# Patient Record
Sex: MALE | Race: BLACK OR AFRICAN AMERICAN | NOT HISPANIC OR LATINO | URBAN - METROPOLITAN AREA
[De-identification: names, ages, dates, MRNs, and addresses within clinical notes are randomized per-mention and may not be internally consistent; named-entity substitution may affect disease eponyms.]

---

## 2020-08-16 ENCOUNTER — INPATIENT (INPATIENT)
Facility: HOSPITAL | Age: 40
LOS: 0 days | Discharge: HOME | DRG: 300 | End: 2020-08-17
Attending: STUDENT IN AN ORGANIZED HEALTH CARE EDUCATION/TRAINING PROGRAM | Admitting: STUDENT IN AN ORGANIZED HEALTH CARE EDUCATION/TRAINING PROGRAM
Payer: MEDICAID

## 2020-08-16 VITALS
TEMPERATURE: 98 F | HEART RATE: 90 BPM | DIASTOLIC BLOOD PRESSURE: 99 MMHG | OXYGEN SATURATION: 97 % | HEIGHT: 70 IN | WEIGHT: 315 LBS | RESPIRATION RATE: 17 BRPM | SYSTOLIC BLOOD PRESSURE: 152 MMHG

## 2020-08-16 DIAGNOSIS — I80.00 PHLEBITIS AND THROMBOPHLEBITIS OF SUPERFICIAL VESSELS OF UNSPECIFIED LOWER EXTREMITY: ICD-10-CM

## 2020-08-16 LAB
ALBUMIN SERPL ELPH-MCNC: 2.8 G/DL — LOW (ref 3.4–5)
ALP SERPL-CCNC: 106 U/L — SIGNIFICANT CHANGE UP (ref 40–120)
ALT FLD-CCNC: 49 U/L — HIGH (ref 12–42)
ANION GAP SERPL CALC-SCNC: 8 MMOL/L — LOW (ref 9–16)
APTT BLD: 137.7 SEC — CRITICAL HIGH (ref 27.5–35.5)
APTT BLD: 19.5 SEC — LOW (ref 27.5–35.5)
APTT BLD: 51.9 SEC — HIGH (ref 27.5–35.5)
AST SERPL-CCNC: 30 U/L — SIGNIFICANT CHANGE UP (ref 15–37)
BASOPHILS # BLD AUTO: 0.05 K/UL — SIGNIFICANT CHANGE UP (ref 0–0.2)
BASOPHILS NFR BLD AUTO: 0.5 % — SIGNIFICANT CHANGE UP (ref 0–2)
BILIRUB SERPL-MCNC: 0.3 MG/DL — SIGNIFICANT CHANGE UP (ref 0.2–1.2)
BUN SERPL-MCNC: 12 MG/DL — SIGNIFICANT CHANGE UP (ref 7–23)
CALCIUM SERPL-MCNC: 9.1 MG/DL — SIGNIFICANT CHANGE UP (ref 8.5–10.5)
CHLORIDE SERPL-SCNC: 105 MMOL/L — SIGNIFICANT CHANGE UP (ref 96–108)
CK SERPL-CCNC: 178 U/L — SIGNIFICANT CHANGE UP (ref 39–308)
CO2 SERPL-SCNC: 23 MMOL/L — SIGNIFICANT CHANGE UP (ref 22–31)
CREAT SERPL-MCNC: 0.91 MG/DL — SIGNIFICANT CHANGE UP (ref 0.5–1.3)
CRP SERPL-MCNC: 6 MG/DL — HIGH (ref 0–0.9)
D DIMER BLD IA.RAPID-MCNC: 213 NG/ML DDU — SIGNIFICANT CHANGE UP
EOSINOPHIL # BLD AUTO: 0.43 K/UL — SIGNIFICANT CHANGE UP (ref 0–0.5)
EOSINOPHIL NFR BLD AUTO: 4.5 % — SIGNIFICANT CHANGE UP (ref 0–6)
GLUCOSE SERPL-MCNC: 113 MG/DL — HIGH (ref 70–99)
HCT VFR BLD CALC: 39 % — SIGNIFICANT CHANGE UP (ref 39–50)
HGB BLD-MCNC: 12.5 G/DL — LOW (ref 13–17)
HIV 1 & 2 AB SERPL IA.RAPID: SIGNIFICANT CHANGE UP
IMM GRANULOCYTES NFR BLD AUTO: 1 % — SIGNIFICANT CHANGE UP (ref 0–1.5)
INR BLD: 0.9 — SIGNIFICANT CHANGE UP (ref 0.88–1.16)
LYMPHOCYTES # BLD AUTO: 2.64 K/UL — SIGNIFICANT CHANGE UP (ref 1–3.3)
LYMPHOCYTES # BLD AUTO: 27.4 % — SIGNIFICANT CHANGE UP (ref 13–44)
MCHC RBC-ENTMCNC: 26.9 PG — LOW (ref 27–34)
MCHC RBC-ENTMCNC: 32.1 GM/DL — SIGNIFICANT CHANGE UP (ref 32–36)
MCV RBC AUTO: 83.9 FL — SIGNIFICANT CHANGE UP (ref 80–100)
MONOCYTES # BLD AUTO: 0.84 K/UL — SIGNIFICANT CHANGE UP (ref 0–0.9)
MONOCYTES NFR BLD AUTO: 8.7 % — SIGNIFICANT CHANGE UP (ref 2–14)
NEUTROPHILS # BLD AUTO: 5.56 K/UL — SIGNIFICANT CHANGE UP (ref 1.8–7.4)
NEUTROPHILS NFR BLD AUTO: 57.9 % — SIGNIFICANT CHANGE UP (ref 43–77)
NRBC # BLD: 0 /100 WBCS — SIGNIFICANT CHANGE UP (ref 0–0)
NT-PROBNP SERPL-SCNC: 26 PG/ML — SIGNIFICANT CHANGE UP
PLATELET # BLD AUTO: 354 K/UL — SIGNIFICANT CHANGE UP (ref 150–400)
POTASSIUM SERPL-MCNC: 4.5 MMOL/L — SIGNIFICANT CHANGE UP (ref 3.5–5.3)
POTASSIUM SERPL-SCNC: 4.5 MMOL/L — SIGNIFICANT CHANGE UP (ref 3.5–5.3)
PROT SERPL-MCNC: 7.4 G/DL — SIGNIFICANT CHANGE UP (ref 6.4–8.2)
PROTHROM AB SERPL-ACNC: 10.9 SEC — SIGNIFICANT CHANGE UP (ref 10.6–13.6)
RBC # BLD: 4.65 M/UL — SIGNIFICANT CHANGE UP (ref 4.2–5.8)
RBC # FLD: 13.8 % — SIGNIFICANT CHANGE UP (ref 10.3–14.5)
SARS-COV-2 RNA SPEC QL NAA+PROBE: SIGNIFICANT CHANGE UP
SODIUM SERPL-SCNC: 136 MMOL/L — SIGNIFICANT CHANGE UP (ref 132–145)
TROPONIN I SERPL-MCNC: <0.017 NG/ML — LOW (ref 0.02–0.06)
WBC # BLD: 9.62 K/UL — SIGNIFICANT CHANGE UP (ref 3.8–10.5)
WBC # FLD AUTO: 9.62 K/UL — SIGNIFICANT CHANGE UP (ref 3.8–10.5)

## 2020-08-16 PROCEDURE — 93010 ELECTROCARDIOGRAM REPORT: CPT

## 2020-08-16 PROCEDURE — 71045 X-RAY EXAM CHEST 1 VIEW: CPT | Mod: 26

## 2020-08-16 PROCEDURE — 93971 EXTREMITY STUDY: CPT | Mod: 26,RT

## 2020-08-16 PROCEDURE — 99285 EMERGENCY DEPT VISIT HI MDM: CPT

## 2020-08-16 PROCEDURE — 73706 CT ANGIO LWR EXTR W/O&W/DYE: CPT | Mod: 26,RT

## 2020-08-16 RX ORDER — HEPARIN SODIUM 5000 [USP'U]/ML
2200 INJECTION INTRAVENOUS; SUBCUTANEOUS
Qty: 25000 | Refills: 0 | Status: DISCONTINUED | OUTPATIENT
Start: 2020-08-16 | End: 2020-08-17

## 2020-08-16 RX ORDER — OXYCODONE AND ACETAMINOPHEN 5; 325 MG/1; MG/1
2 TABLET ORAL EVERY 4 HOURS
Refills: 0 | Status: DISCONTINUED | OUTPATIENT
Start: 2020-08-16 | End: 2020-08-17

## 2020-08-16 RX ORDER — ONDANSETRON 8 MG/1
8 TABLET, FILM COATED ORAL ONCE
Refills: 0 | Status: COMPLETED | OUTPATIENT
Start: 2020-08-16 | End: 2020-08-16

## 2020-08-16 RX ORDER — VANCOMYCIN HCL 1 G
2000 VIAL (EA) INTRAVENOUS ONCE
Refills: 0 | Status: COMPLETED | OUTPATIENT
Start: 2020-08-16 | End: 2020-08-16

## 2020-08-16 RX ORDER — HEPARIN SODIUM 5000 [USP'U]/ML
INJECTION INTRAVENOUS; SUBCUTANEOUS
Qty: 25000 | Refills: 0 | Status: DISCONTINUED | OUTPATIENT
Start: 2020-08-16 | End: 2020-08-16

## 2020-08-16 RX ORDER — CEFAZOLIN SODIUM 1 G
1000 VIAL (EA) INJECTION EVERY 8 HOURS
Refills: 0 | Status: DISCONTINUED | OUTPATIENT
Start: 2020-08-16 | End: 2020-08-17

## 2020-08-16 RX ORDER — HEPARIN SODIUM 5000 [USP'U]/ML
10000 INJECTION INTRAVENOUS; SUBCUTANEOUS ONCE
Refills: 0 | Status: COMPLETED | OUTPATIENT
Start: 2020-08-16 | End: 2020-08-16

## 2020-08-16 RX ORDER — MORPHINE SULFATE 50 MG/1
4 CAPSULE, EXTENDED RELEASE ORAL ONCE
Refills: 0 | Status: DISCONTINUED | OUTPATIENT
Start: 2020-08-16 | End: 2020-08-16

## 2020-08-16 RX ORDER — HEPARIN SODIUM 5000 [USP'U]/ML
2000 INJECTION INTRAVENOUS; SUBCUTANEOUS
Qty: 25000 | Refills: 0 | Status: DISCONTINUED | OUTPATIENT
Start: 2020-08-16 | End: 2020-08-16

## 2020-08-16 RX ORDER — ACETAMINOPHEN 500 MG
975 TABLET ORAL ONCE
Refills: 0 | Status: COMPLETED | OUTPATIENT
Start: 2020-08-16 | End: 2020-08-16

## 2020-08-16 RX ADMIN — MORPHINE SULFATE 4 MILLIGRAM(S): 50 CAPSULE, EXTENDED RELEASE ORAL at 07:13

## 2020-08-16 RX ADMIN — MORPHINE SULFATE 4 MILLIGRAM(S): 50 CAPSULE, EXTENDED RELEASE ORAL at 06:25

## 2020-08-16 RX ADMIN — Medication 100 MILLIGRAM(S): at 21:26

## 2020-08-16 RX ADMIN — HEPARIN SODIUM 20 UNIT(S)/HR: 5000 INJECTION INTRAVENOUS; SUBCUTANEOUS at 17:05

## 2020-08-16 RX ADMIN — Medication 975 MILLIGRAM(S): at 08:14

## 2020-08-16 RX ADMIN — HEPARIN SODIUM 22 UNIT(S)/HR: 5000 INJECTION INTRAVENOUS; SUBCUTANEOUS at 23:59

## 2020-08-16 RX ADMIN — HEPARIN SODIUM 10000 UNIT(S): 5000 INJECTION INTRAVENOUS; SUBCUTANEOUS at 10:00

## 2020-08-16 RX ADMIN — Medication 975 MILLIGRAM(S): at 08:44

## 2020-08-16 RX ADMIN — OXYCODONE AND ACETAMINOPHEN 2 TABLET(S): 5; 325 TABLET ORAL at 21:47

## 2020-08-16 RX ADMIN — ONDANSETRON 8 MILLIGRAM(S): 8 TABLET, FILM COATED ORAL at 07:13

## 2020-08-16 RX ADMIN — HEPARIN SODIUM 2400 UNIT(S)/HR: 5000 INJECTION INTRAVENOUS; SUBCUTANEOUS at 09:59

## 2020-08-16 RX ADMIN — OXYCODONE AND ACETAMINOPHEN 2 TABLET(S): 5; 325 TABLET ORAL at 22:47

## 2020-08-16 RX ADMIN — Medication 250 MILLIGRAM(S): at 10:00

## 2020-08-16 NOTE — ED PROVIDER NOTE - PROGRESS NOTE DETAILS
signed out to day team pending CTA/US and appropriate dispo signed out to me pending followup imaging/re-eval. US shows femoral DVT, exam limited due to subcutaneous edema. CTA shows cellulitis of the right lower extremity with reactive lymphadenopathy in the pelvis and inguinal nodes with venous varicosities, otherwise patent arteries. on bedside evaluation, +right lower edema with darkening of skin consistent with underlying PVD, +warmth, semi soft compartments improved with leg elevation. patient unable to bear weight due to pain. distal pulses intact. able to move digits. IV heparin drip/bolus ordered, vancomycin for cellulitis started, d/w Dr. Lo vascular on call, admit to regional bed for further management, low suspicion for covid-19, no s/s covid, covid swab pcr pending, patient agrees with plan signed out to me pending followup imaging/re-eval. US shows femoral DVT, exam limited due to subcutaneous edema. CTA shows cellulitis of the right lower extremity with reactive lymphadenopathy in the pelvis and inguinal nodes with venous varicosities, otherwise patent arteries. on bedside evaluation, +right lower edema with darkening of skin consistent with most likely underlying PVD, +warmth with erythema, semi soft compartments improved with leg elevation. patient unable to bear weight due to pain. distal pulses intact. able to move toes. sensation intact. IV heparin drip/bolus ordered, vancomycin for cellulitis started, d/w Dr. Lo vascular on call, admit to regional bed for further management, low suspicion for covid-19, no s/s covid, covid swab pcr pending, patient agrees with plan

## 2020-08-16 NOTE — ED ADULT TRIAGE NOTE - CHIEF COMPLAINT QUOTE
pt complains of right lower leg cellulitis. States he was at a [facility] few days ago for the same complaint.

## 2020-08-16 NOTE — ED PROVIDER NOTE - CARE PLAN
Principal Discharge DX:	Leg swelling Principal Discharge DX:	DVT (deep venous thrombosis)  Secondary Diagnosis:	Cellulitis

## 2020-08-16 NOTE — ED PROVIDER NOTE - ATTENDING CONTRIBUTION TO CARE
39 yo M with PMHx of DVT 2 yrs ago, off AC 1 yr ago, presenting c/o worsening RLE pain x 2d.  PE VSS in NAD RLE with (+) swelling R mid thigh to R foot, DPI - DP (+)2, (+) calf tenderness (+) mild anterior erythema and skin changes suggestive of cellulitis vs chronic PVD. will do bedside US r/o DVT, CT to r/o myositis, check labs, likely transfer/admission depending on study results.

## 2020-08-16 NOTE — ED ADULT NURSE NOTE - CHIEF COMPLAINT QUOTE
pt complains of right lower leg cellulitis. States he was at a [facility] few days ago for the same complaint.

## 2020-08-16 NOTE — ED PROVIDER NOTE - PHYSICAL EXAMINATION
Gen - WDWN, NAD, comfortable and non-toxic appearing  Skin - warm, dry, intact   HEENT - AT/NC, airway patent, neck supple   CV - S1S2, R/R/R  Resp - CTAB, no r/r/w  GI - soft, ND, NT, no CVAT b/l   MS - w/w/p, no c/c/e  Neuro - AxOx3, ambulatory without gait disturbance Vital Signs - nursing notes reviewed and confirmed  Gen - Obese M, NAD, comfortable and non-toxic appearing, speaking in full sentences   Skin - warm, dry, intact  HEENT - AT/NC, PERRL, EOMI, no conjunctival injection, moist oral mucosa, TM intact b/l with good cone of lights, o/p clear with no erythema, edema, or exudate, uvula midline, airway patent, neck supple and NT, FROM, no JVD or carotid bruits b/l, no palpable nodes  CV - S1S2, R/R/R  Resp - respiration non-labored, CTAB, symmetric bs b/l, no r/r/w  GI - NABS, soft, ND, NT, no rebound or guarding, no CVAT b/l   MS - w/w/p, RLE with moderate edema from ankle to popliteal region with venous stasis skin changes, compartment semi tense with diffuse calf tenderness, no erythema, warmth, streaking, or ecchymosis, +SILT, palpable distal pulses b/l  Neuro - AxOx3, no focal neuro deficits, difficulty weight bearing on the R 2/2 pain

## 2020-08-16 NOTE — H&P ADULT - NSHPPHYSICALEXAM_GEN_ALL_CORE
Constitutional: Man appearing his stated age. Appears uncomfortable and in pain. Not in any acute distress.  Cardiac: Normal sinus rhythm. No chest wall tenderness.  Respiratory: Equal and bilateral chest rise. No respiratory distress. 99% O2 sat on RA.  GI: Abdomen soft and nontender throughout all 4 quadrants.  Vascular:   Left side (unaffected): Biphasic femoral, biphasic popliteal, biphasic PT, palpable DP  Right side (affected): Biphasic femoral, triphasic popliteal, triphasic PT, palpable DP  Extremities: Right lower extremity appears markedly more swollen compared to the left, most pronounced in the below-knee segment. Right lower extremity with moderate degree of hyperpigmentation extending proximally from the ankle to the mid-calf. No wounds or ulcers noted of bilateral lower extremities. No tenderness to palpation of bilateral lower extremities.

## 2020-08-16 NOTE — H&P ADULT - ASSESSMENT
Assessment    Mr. Boucher is a 40 year old man with a known history of unprovoked DVT (last episode 2 years ago, tx with Xarelto for 1y, taken off) presenting with 10 day Hx of RLE pain and swelling with DUS notable for DVT visualized within the right mid and distal femoral vein.    Plan:  -Diagnosis: DVT with cellulitis  -Continue anticoagulation (currently heparinized @ 24mL/hr)  -STAT PTT (last PTT @10am)  -STAT EKG   -Urgent CXR  -Ancef 1000mg IV Q12 x7d for cellulitis  -Compression and elevation of RLE  -Regular diet Assessment    Mr. Boucher is a 40 year old man with a known history of unprovoked DVT (last episode 2 years ago, tx with Xarelto for 1y, taken off) presenting with 10 day Hx of RLE pain and swelling with DUS notable for DVT visualized within the right mid and distal femoral vein.    Plan:  -Diagnosis: DVT with cellulitis  -Continue anticoagulation (currently heparinized @ 24mL/hr)  -STAT PTT (last PTT @10am)  -STAT EKG   -Urgent CXR  -Ancef 1000mg IV Q12 x7d for cellulitis  -Compression and elevation of RLE  -Regular diet      Senior Resident Note:  Agree with above. Hemodynamically stable, saturating well on room air. Physical exam notable for 2+ edema, non-tender to palpation, no wounds, palpable DP+, hyperpigmentation of skin and warm to touch. Given clinical and CT findings of cellulitis and DVT, plan for Ancef with heparin gtt.

## 2020-08-16 NOTE — PATIENT PROFILE ADULT - NSPROMUTANXFEARADDRESSFT_GEN_A_NUR
Subjective


Date of Service: 04/26/17


Interval History: 





Patient still with headache she gets daily for last year. No blurry vision.





Objective


Active Medications: 








Acetaminophen (Tylenol Tab*)  650 mg PO Q4H PRN


   PRN Reason: FEVER/PAIN


   Last Admin: 04/26/17 15:42 Dose:  650 mg


Acetaminophen/Butalbital/Caffeine (Fioricet Tab*)  1 tab PO Q4H PRN


   PRN Reason: HEADACHE


   Last Admin: 04/26/17 12:21 Dose:  1 tab


Hydrocodone Bitart/Acetaminophen (Norco 5-325 Tab*)  1 tab PO Q4H PRN


   PRN Reason: PAIN


Aspirin (Aspirin Ec Low Dose*)  81 mg PO DAILY [facility]


   Last Admin: 04/26/17 07:54 Dose:  81 mg


Atorvastatin Calcium (Lipitor*)  20 mg PO BEDTIME [facility]


   Last Admin: 04/25/17 20:31 Dose:  20 mg


Dextrose (D50w Syringe 50 Ml*)  12.5 gm IV PUSH .FOR FS < 60 - SS PRN


   PRN Reason: FS < 60


Enoxaparin Sodium (Lovenox(*))  70 mg SUBCUT Q12H [facility]


Sodium Chloride (Ns 0.9% 1000 Ml*)  1,000 mls @ 150 mls/hr IV PER RATE [facility]


   Last Admin: 04/26/17 11:59 Dose:  150 mls/hr


Insulin Human Lispro (Humalog*)  0 units SUBCUT AC [facility]


   PRN Reason: Protocol


   Last Admin: 04/26/17 16:56 Dose:  Not Given


Magnesium Oxide (Magox 400 Tab*)  400 mg PO DAILY [facility]


   Last Admin: 04/26/17 07:54 Dose:  400 mg


Metoclopramide HCl (Reglan Tab*)  5 mg PO TID PRN


   PRN Reason: NAUSEA


Metoprolol Succinate (Toprol Xl Tab*)  25 mg PO DAILY [facility]


   Last Admin: 04/26/17 07:54 Dose:  25 mg


Ondansetron HCl (Zofran Inj*)  4 mg IV Q6H PRN


   PRN Reason: NAUSEA


Tramadol HCl (Ultram*)  50 mg PO Q6H PRN


   PRN Reason: PAIN


   Last Admin: 04/26/17 07:54 Dose:  50 mg


Warfarin Sodium (Coumadin Tab(*))  5 mg PO DAILY@1700 [facility]


   PRN Reason: Protocol








 Vital Signs











  04/25/17 04/25/17 04/25/17





  19:00 19:01 19:13


 


Temperature   


 


Pulse Rate 82 79 


 


Respiratory 17 17 





Rate   


 


Blood Pressure   143/72





(mmHg)   


 


O2 Sat by Pulse 95 96 





Oximetry   














  04/25/17 04/25/17 04/25/17





  19:30 19:47 20:00


 


Temperature  97.7 F 


 


Pulse Rate 78 87 91


 


Respiratory 16 16 16





Rate   


 


Blood Pressure 130/63 140/75 129/66





(mmHg)   


 


O2 Sat by Pulse 97 98 96





Oximetry   














  04/25/17 04/25/17 04/26/17





  20:03 23:16 00:34


 


Temperature  98.0 F 


 


Pulse Rate 91 93 


 


Respiratory 19 16 





Rate   


 


Blood Pressure  129/64 





(mmHg)   


 


O2 Sat by Pulse 97 94 94





Oximetry   














  04/26/17 04/26/17 04/26/17





  03:23 07:29 07:43


 


Temperature 98.4 F 97.9 F 


 


Pulse Rate 75 100 


 


Respiratory 16 16 16





Rate   


 


Blood Pressure 113/49 151/67 





(mmHg)   


 


O2 Sat by Pulse 95 96 





Oximetry   














  04/26/17 04/26/17 04/26/17





  07:54 09:54 11:21


 


Temperature   97.8 F


 


Pulse Rate   81


 


Respiratory 16 16 16





Rate   


 


Blood Pressure   111/61





(mmHg)   


 


O2 Sat by Pulse   94





Oximetry   














  04/26/17 04/26/17 04/26/17





  12:21 14:21 14:43


 


Temperature   98.1 F


 


Pulse Rate   73


 


Respiratory 18 16 18





Rate   


 


Blood Pressure   136/59





(mmHg)   


 


O2 Sat by Pulse   100





Oximetry   











Oxygen Devices in Use Now: None


Appearance: Elderly woman lying in bed in NAD


Eyes: No Scleral Icterus


Ears/Nose/Mouth/Throat: Mucous Membranes Moist


Neck: No Thyroid Enlargement, Masses


Respiratory: Clear to Auscultation


Cardiovascular: - - S1S2 kat


Abdominal: NL Sounds; No Tenderness; No Distention, No Hepatosplenomegaly


Lymphatic: No Cervical Adenopathy


Extremities: No Clubbing, Cyanosis


Skin: No Rash or Ulcers


Neurological: Alert and Oriented x 3


Result Diagrams: 


 04/26/17 05:27





 04/26/17 05:27





Assess/Plan/Problems-Billing


Assessment: 





80 year old with headache and blurry vision found to have new PE on CTA of 

chest.





- Patient Problems


(1) Pulmonary embolism


Current Visit: Yes   Status: Acute   Code(s): I26.99 - OTHER PULMONARY EMBOLISM 

WITHOUT ACUTE COR PULMONALE   SNOMED Code(s): 01341336


   Comment: Appreciate hematology's input. They think likely failure of 

Eliquis. Start Lovenox and coumadin.   





(2) CAD (coronary artery disease)


Current Visit: Yes   Status: Acute   Code(s): I25.10 - ATHSCL HEART DISEASE OF 

NATIVE CORONARY ARTERY W/O ANG PCTRS   SNOMED Code(s): 22286888


   Comment: Stable. Continue current regimen.   





(3) Diabetes


Current Visit: Yes   Status: Acute   Code(s): E11.9 - TYPE 2 DIABETES MELLITUS 

WITHOUT COMPLICATIONS   SNOMED Code(s): 18660384


   Comment: FS from 88 - 202 . Continue FS with SSI.   





(4) Hypertension


Current Visit: Yes   Status: Acute   Code(s): I10 - ESSENTIAL (PRIMARY) 

HYPERTENSION   SNOMED Code(s): 34506618


   Comment: BP adequately controlled. Continue current rx.   





(5) Hyperlipidemia


Current Visit: Yes   Status: Acute   Code(s): E78.5 - HYPERLIPIDEMIA, 

UNSPECIFIED   SNOMED Code(s): 13129350


   Comment: Stable. Continue statin.   





(6) Headache


Current Visit: Yes   Status: Acute   Code(s): R51 - HEADACHE   SNOMED Code(s): 

89250898


   Comment: Questionable etiology. Trial of Fioricet. ?? PT. Follow up with 

neurology as outpt.   





(7) DVT prophylaxis


Current Visit: Yes   Status: Acute   Code(s): NGT9070 -    SNOMED Code(s): 

719046323


   Comment: Lovenox   





(8) Full code status


Current Visit: Yes   Status: Acute   Code(s): Z78.9 - OTHER SPECIFIED HEALTH 

STATUS   SNOMED Code(s): 246748505 n/a

## 2020-08-16 NOTE — H&P ADULT - HISTORY OF PRESENT ILLNESS
Mr. Taylor is a 40 year old man who presented to [facility] this morning with a chief complaint of worsening RLE pain. He was transferred to [facility] for further management. Per Mr. Taylor, he has a PMH of DVT for which he was treated 2 years ago. At that time, he was treated with an approximately 1 year course of Xarelto, after which he was taken off because he did not have any further episodes of DVT. He does not recall any provocating factors for his previous DVT. He notes he has never been diagnosed with any kind of hypercoagulable disorder but notes that his mother had passed away because of a massive pulmonary embolism. Today, Mr. Taylor reports that he has had a gradual onset of pain and swelling of his RLE for the past 10 days. He initially went to a hospital in [place], where they did an ultrasound that was negative. He was diagnosed with cellulitis and discharged home with a regimen of Bactrim. He notes that he has been taking the antibiotics without any resultant improvement in his symptoms. He ultimately came to the hospital today because he experienced unbearable pain that made it difficult for him to walk. He expresses associated shortness of breath but no difficulty breathing. He denies any fevers, chills, chest pain, palpations, cough, or wheezing.

## 2020-08-16 NOTE — PATIENT PROFILE ADULT - ARE SIGNIFICANT INDICATORS COMPLETE.
Detail Level: Simple Price (Do Not Change): 0.00 Instructions: This plan will send the code FBSE to the PM system.  DO NOT or CHANGE the price. Yes

## 2020-08-16 NOTE — ED ADULT NURSE NOTE - CHPI ED NUR SYMPTOMS NEG
no dizziness/no nausea/no weakness/no chills/no pain/no vomiting/no fever/no tingling/no decreased eating/drinking

## 2020-08-16 NOTE — ED PROVIDER NOTE - OBJECTIVE STATEMENT
41 yo M with PMHx of DVT 2 yrs ago, off AC 1 yr ago, presenting c/o worsening RLE pain x 2d.  Pt reports gradual onset of pain and swelling to the RLE since 10d, seen at hospital in [facility] with no DVT seen at that time and dx with cellulitis, d/c'd home on course of ibuprofen and bactrim.  Pt has been taking them without improvement in sx. Noted worsening pain today with difficulty walking, MARRERO, and mild chest tightness with ambulation.  Denies fever, chills, trauma, fall, warmth, insect bite, palpitations, diaphoresis, SOB, orthopnea, cough, hemoptysis, wheezing, focal weakness, numbness, tingling, paresthesia, HA, dizziness, neck pain, N/V/D/C, abdominal pain, change in urinary/bowel function, and malaise.

## 2020-08-16 NOTE — H&P ADULT - NSHPLABSRESULTS_GEN_ALL_CORE
FINDINGS:    Thigh veins: The right commonfemoral, proximal femoral, proximal greater saphenous, and proximal deep femoral veins are patent and free of thrombus. The veins are normally compressible and have normal phasic flow and augmentation response. There is thrombus within the right mid and distal femoral vein. The right popliteal vein was not visualized due to extensive subcutaneous edema.    Calf veins: The paired peroneal and posterior tibial calf veins were not visualized due to extensive subcutaneous edema.    Contralateral CFV:The contralateral (left) common femoral vein is patent and free of thrombus.      IMPRESSION:  Deep vein thrombosis visualized within the right mid and distal femoral vein. The right popliteal and calf veins were not visualized due to extensive subcutaneous edema.    The above results were discussed with Dr. Mason  on 8/16/2020 8:51 AM.

## 2020-08-16 NOTE — ED ADULT NURSE NOTE - OBJECTIVE STATEMENT
c/o RLE pain swelling, greater in circumference than left  pt endorsed increase swelling x 10 days  skin taut, firm to touch beginning  right mid calf to foot  pedal pulses present

## 2020-08-16 NOTE — H&P ADULT - NSHPSOCIALHISTORY_GEN_ALL_CORE
Alcohol: Occasional alcohol use   Smoking: Occasional cigarette and marijuana use  Substance Use: Never user of any IV drugs

## 2020-08-16 NOTE — ED PROVIDER NOTE - CLINICAL SUMMARY MEDICAL DECISION MAKING FREE TEXT BOX
pt with h/o DVT, off AC x 1 yr now, p/w progressive worsening RLE pain and swelling, afebrile, on bactrim x few days for possible cellulitis, will obtain labs, CTA to r/o arterial insufficiency w venous run off, possible US, pain control, and reassess

## 2020-08-17 VITALS
DIASTOLIC BLOOD PRESSURE: 95 MMHG | SYSTOLIC BLOOD PRESSURE: 136 MMHG | OXYGEN SATURATION: 100 % | HEART RATE: 60 BPM | TEMPERATURE: 98 F | RESPIRATION RATE: 16 BRPM

## 2020-08-17 LAB
ANION GAP SERPL CALC-SCNC: 8 MMOL/L — SIGNIFICANT CHANGE UP (ref 5–17)
APTT BLD: 71.6 SEC — HIGH (ref 27.5–35.5)
BUN SERPL-MCNC: 7 MG/DL — SIGNIFICANT CHANGE UP (ref 7–23)
CALCIUM SERPL-MCNC: 9.3 MG/DL — SIGNIFICANT CHANGE UP (ref 8.4–10.5)
CHLORIDE SERPL-SCNC: 102 MMOL/L — SIGNIFICANT CHANGE UP (ref 96–108)
CO2 SERPL-SCNC: 26 MMOL/L — SIGNIFICANT CHANGE UP (ref 22–31)
CREAT SERPL-MCNC: 0.85 MG/DL — SIGNIFICANT CHANGE UP (ref 0.5–1.3)
GLUCOSE SERPL-MCNC: 96 MG/DL — SIGNIFICANT CHANGE UP (ref 70–99)
HCT VFR BLD CALC: 40.2 % — SIGNIFICANT CHANGE UP (ref 39–50)
HGB BLD-MCNC: 12.6 G/DL — LOW (ref 13–17)
MAGNESIUM SERPL-MCNC: 2.3 MG/DL — SIGNIFICANT CHANGE UP (ref 1.6–2.6)
MCHC RBC-ENTMCNC: 26.8 PG — LOW (ref 27–34)
MCHC RBC-ENTMCNC: 31.3 GM/DL — LOW (ref 32–36)
MCV RBC AUTO: 85.4 FL — SIGNIFICANT CHANGE UP (ref 80–100)
NRBC # BLD: 0 /100 WBCS — SIGNIFICANT CHANGE UP (ref 0–0)
PHOSPHATE SERPL-MCNC: 2.7 MG/DL — SIGNIFICANT CHANGE UP (ref 2.5–4.5)
PLATELET # BLD AUTO: 384 K/UL — SIGNIFICANT CHANGE UP (ref 150–400)
POTASSIUM SERPL-MCNC: 4.5 MMOL/L — SIGNIFICANT CHANGE UP (ref 3.5–5.3)
POTASSIUM SERPL-SCNC: 4.5 MMOL/L — SIGNIFICANT CHANGE UP (ref 3.5–5.3)
RBC # BLD: 4.71 M/UL — SIGNIFICANT CHANGE UP (ref 4.2–5.8)
RBC # FLD: 13.7 % — SIGNIFICANT CHANGE UP (ref 10.3–14.5)
SODIUM SERPL-SCNC: 136 MMOL/L — SIGNIFICANT CHANGE UP (ref 135–145)
WBC # BLD: 9.69 K/UL — SIGNIFICANT CHANGE UP (ref 3.8–10.5)
WBC # FLD AUTO: 9.69 K/UL — SIGNIFICANT CHANGE UP (ref 3.8–10.5)

## 2020-08-17 PROCEDURE — 85379 FIBRIN DEGRADATION QUANT: CPT

## 2020-08-17 PROCEDURE — 83880 ASSAY OF NATRIURETIC PEPTIDE: CPT

## 2020-08-17 PROCEDURE — 96374 THER/PROPH/DIAG INJ IV PUSH: CPT | Mod: XU

## 2020-08-17 PROCEDURE — 85027 COMPLETE CBC AUTOMATED: CPT

## 2020-08-17 PROCEDURE — 83735 ASSAY OF MAGNESIUM: CPT

## 2020-08-17 PROCEDURE — 73706 CT ANGIO LWR EXTR W/O&W/DYE: CPT

## 2020-08-17 PROCEDURE — 86703 HIV-1/HIV-2 1 RESULT ANTBDY: CPT

## 2020-08-17 PROCEDURE — 85730 THROMBOPLASTIN TIME PARTIAL: CPT

## 2020-08-17 PROCEDURE — 84100 ASSAY OF PHOSPHORUS: CPT

## 2020-08-17 PROCEDURE — 85025 COMPLETE CBC W/AUTO DIFF WBC: CPT

## 2020-08-17 PROCEDURE — 84484 ASSAY OF TROPONIN QUANT: CPT

## 2020-08-17 PROCEDURE — 99285 EMERGENCY DEPT VISIT HI MDM: CPT | Mod: 25

## 2020-08-17 PROCEDURE — 93005 ELECTROCARDIOGRAM TRACING: CPT

## 2020-08-17 PROCEDURE — 99223 1ST HOSP IP/OBS HIGH 75: CPT

## 2020-08-17 PROCEDURE — 82550 ASSAY OF CK (CPK): CPT

## 2020-08-17 PROCEDURE — 80053 COMPREHEN METABOLIC PANEL: CPT

## 2020-08-17 PROCEDURE — 87635 SARS-COV-2 COVID-19 AMP PRB: CPT

## 2020-08-17 PROCEDURE — 36415 COLL VENOUS BLD VENIPUNCTURE: CPT

## 2020-08-17 PROCEDURE — 86140 C-REACTIVE PROTEIN: CPT

## 2020-08-17 PROCEDURE — 99238 HOSP IP/OBS DSCHRG MGMT 30/<: CPT

## 2020-08-17 PROCEDURE — 85610 PROTHROMBIN TIME: CPT

## 2020-08-17 PROCEDURE — 93971 EXTREMITY STUDY: CPT

## 2020-08-17 PROCEDURE — 71045 X-RAY EXAM CHEST 1 VIEW: CPT

## 2020-08-17 PROCEDURE — 80048 BASIC METABOLIC PNL TOTAL CA: CPT

## 2020-08-17 PROCEDURE — 96375 TX/PRO/DX INJ NEW DRUG ADDON: CPT | Mod: XU

## 2020-08-17 RX ORDER — CEPHALEXIN 500 MG
1 CAPSULE ORAL
Qty: 7 | Refills: 0
Start: 2020-08-17 | End: 2020-08-23

## 2020-08-17 RX ORDER — CEPHALEXIN 500 MG
1 CAPSULE ORAL
Qty: 28 | Refills: 0
Start: 2020-08-17 | End: 2020-08-23

## 2020-08-17 RX ORDER — RIVAROXABAN 15 MG-20MG
1 KIT ORAL
Qty: 1 | Refills: 0
Start: 2020-08-17

## 2020-08-17 RX ADMIN — OXYCODONE AND ACETAMINOPHEN 2 TABLET(S): 5; 325 TABLET ORAL at 05:59

## 2020-08-17 RX ADMIN — Medication 100 MILLIGRAM(S): at 04:59

## 2020-08-17 RX ADMIN — OXYCODONE AND ACETAMINOPHEN 2 TABLET(S): 5; 325 TABLET ORAL at 10:00

## 2020-08-17 RX ADMIN — OXYCODONE AND ACETAMINOPHEN 2 TABLET(S): 5; 325 TABLET ORAL at 04:59

## 2020-08-17 RX ADMIN — OXYCODONE AND ACETAMINOPHEN 2 TABLET(S): 5; 325 TABLET ORAL at 09:08

## 2020-08-17 NOTE — PROGRESS NOTE ADULT - SUBJECTIVE AND OBJECTIVE BOX
24hr Events:  O/N:10pm PTT 51.9, heparin gtt increased from 20ml/hr to 22ml/hr, VSS  8/16: Admitted for cellulitis + DVT. Heparin initially at 24, supratherapeutic, held for 1h and restarted at 20. Ancef [id number] for cellulitis. Regular diet. HDS, satting well on RA. Compression and elevation of leg.        Assessment/Plan:  Mr. Boucher is a 40 year old man with a known history of unprovoked DVT (last episode 2 years ago, tx with Xarelto for 1y, taken off) presenting with 10 day Hx of RLE pain and swelling with DUS notable for DVT visualized within the right mid and distal femoral vein.    Plan:  -Diagnosis: DVT with cellulitis  -Continue anticoagulation (currently heparinized @ 22mL/hr)  CXR  -Ancef 1000mg IV Q12 x7d for cellulitis  -Compression and elevation of RLE  -Regular diet 24hr Events:  O/N:10pm PTT 51.9, heparin gtt increased from 20ml/hr to 22ml/hr, VSS  8/16: Admitted for cellulitis + DVT. Heparin initially at 24, supratherapeutic, held for 1h and restarted at 20. Ancef [id number] for cellulitis. Regular diet. HDS, satting well on RA. Compression and elevation of leg.    This AM: Patient resting in bed endorsing RLE pain. No chest pain or SOB, vital signs stable.      MEDICATIONS  (STANDING):  ceFAZolin   IVPB 1000 milliGRAM(s) IV Intermittent every 8 hours  heparin  Infusion 2200 Unit(s)/Hr (22 mL/Hr) IV Continuous <Continuous>    MEDICATIONS  (PRN):  oxycodone    5 mG/acetaminophen 325 mG 2 Tablet(s) Oral every 4 hours PRN Severe Pain (7 - 10)      Allergies    No Known Allergies    Intolerances          Vital Signs Last 24 Hrs  T(C): 36.9 (17 Aug 2020 05:23), Max: 37.2 (16 Aug 2020 16:17)  T(F): 98.4 (17 Aug 2020 05:23), Max: 98.9 (16 Aug 2020 16:17)  HR: 69 (17 Aug 2020 05:23) (63 - 71)  BP: 137/88 (17 Aug 2020 05:23) (132/87 - 137/92)  BP(mean): --  RR: 17 (17 Aug 2020 05:23) (17 - 18)  SpO2: 98% (17 Aug 2020 05:23) (98% - 100%)  CAPILLARY BLOOD GLUCOSE          08-16 @ 07:01  -  08-17 @ 07:00  --------------------------------------------------------  IN: 1624 mL / OUT: 1640 mL / NET: -16 mL        Physical Exam:    Daily     Daily   General:  Well appearing, NAD  CV:  RRR  Lungs:  nonlabored breathing  Abdomen:  Soft, non-tender, no distended  Extremities: RLE edema, mild tenderness to palpation. Palpable pulses b/l.   Neuro:  AAOx3    LABS:                        12.5   9.62  )-----------( 354      ( 16 Aug 2020 06:54 )             39.0     08-16    136  |  105  |  12  ----------------------------<  113<H>  4.5   |  23  |  0.91    Ca    9.1      16 Aug 2020 06:29    TPro  7.4  /  Alb  2.8<L>  /  TBili  0.3  /  DBili  x   /  AST  30  /  ALT  49<H>  /  AlkPhos  106  08-16    PT/INR - ( 16 Aug 2020 06:29 )   PT: 10.9 sec;   INR: 0.90          PTT - ( 16 Aug 2020 22:44 )  PTT:51.9 sec        ---------------------------------------------------------------------------  PLEASE CHECK WHEN PRESENT:     [  ] Heart Failure     [  ] Acute     [  ] Acute on Chronic     [  ] Chronic  -------------------------------------------------------------------     [  ]Diastolic [HFpEF]     [  ]Systolic [HFrEF]     [  ]Combined [HFpEF & HFrEF]     [  ] afib     [  ] hypertensive heart disease     [  ]Other:  -------------------------------------------------------------------  [ ] Respiratory failure  [ ] Acute cor pulmonale  [ ] Asthma/COPD Exacerbation  [ ] Pleural effusion  [ ] Aspiration pneumonia  -------------------------------------------------------------------  [  ]VAISHALI     [  ]ATN     [  ]Reneal Medullary Necrosis     [  ]Renal Cortical Necrosis     [  ]Other Pathological Lesions:    [  ]CKD 1  [  ]CKD 2  [  ]CKD 3  [  ]CKD 4  [  ]CKD 5  [  ]Other  -------------------------------------------------------------------  [  ]Diabetes  [  ] Diabetic PVD Ulcer  [  ] Neuropathic ulcer to DM  [  ] Diabetes with Nephropathy  [  ] Osteomyelitis due to diabetes  --------------------------------------------------------------------  [  ]Malnutrition: See Nutrition Note  [  ]Cachexia  [  ]Other:   [  ]Supplement Ordered:  [x]Morbid Obesity (BMI >=40]  ---------------------------------------------------------------------  [ ] Sepsis/severe sepsis/septic shock  [ ] UTI  [ ] Pneumonia  -----------------------------------------------------------------------  [ ] Acidosis/alkalosis  [ ] Fluid overload  [ ] Hypokalemia  [ ] Hyperkalemia  [ ] Hypomagnesemia  [ ] Hypophosphatemia  [ ] Hyperphosphatemia  ------------------------------------------------------------------------  [ ] Acute blood loss anemia  [ ] Post op blood loss anemia  [ ] Iron deficiency anemia  [ ] Anemia due to chronic disease  [ ] Hypercoagulable state  ----------------------------------------------------------------------  [ ] Cerebral infarction  [ ] Transient ischemia attack  [ ] Encephalopathy      Assessment/Plan:  Mr. Taylor is a 40 year old man with a known history of unprovoked DVT (last episode 2 years ago, tx with Xarelto for 1y, taken off) presenting with 10 day Hx of RLE pain and swelling with DUS notable for DVT visualized within the right mid and distal femoral vein. Diagnosed with DVT with cellulitis.    Plan:  -Continue anticoagulation (currently heparinized @ 22mL/hr)  CXR  -Ancef 1000mg IV Q12 x7d for cellulitis  -Compression and elevation of RLE  -Regular diet  -Pain control - 5 mg/325 mg oxycodone/acetaminophen

## 2020-08-17 NOTE — DISCHARGE NOTE PROVIDER - NSDCMRMEDTOKEN_GEN_ALL_CORE_FT
Keflex 500 mg oral capsule: 1 cap(s) orally 4 times a day   Xarelto Starter Pack 15 mg-20 mg oral kit: 1 packet(s) orally once   15 mg twice a day for 3 weeks  20 mg once a day after that indefinitely Keflex 500 mg oral capsule: 1 cap(s) orally 4 times a day   oxycodone-acetaminophen 5 mg-300 mg oral tablet: 1 tab(s) orally every 12 hours MDD:2 tabs  Xarelto Starter Pack 15 mg-20 mg oral kit: 1 packet(s) orally once   15 mg twice a day for 3 weeks  20 mg once a day after that indefinitely

## 2020-08-17 NOTE — DISCHARGE NOTE PROVIDER - HOSPITAL COURSE
39 yo M who presented to [facility] this morning w/ a CC of worsening RLE pain. He was transferred to [facility] for further management. He has a PMH of DVT for which he was treated 2 years ago. At that time, he was treated with an approximately 1 year course of Xarelto, after which he was taken off because he did not have any further episodes of DVT. He does not recall any provocating factors for his previous DVT. He notes he has never been diagnosed w/ any kind of hypercoagulable disorder but notes that his mother had passed away because of a massive pulmonary embolism. Today, Mr. Taylor reports that he has had a gradual onset of pain and swelling of his RLE for the past 10 days. He initially went to a hospital in [place], where they did an ultrasound that was negative. He was diagnosed with cellulitis and discharged home w/ a regimen of Bactrim. He notes that he has been taking the antibiotics without any resultant improvement in his symptoms. He ultimately came to the hospital today because he experienced unbearable pain that made it difficult for him to walk. He expresses associated shortness of breath but no difficulty breathing. He denies any fevers, chills, chest pain, palpations, cough, or wheezing. The patient was admitted to Vascular Surgery service for further care.            8/16: Admitted for cellulitis + DVT. Heparin initially at 24 mL/hr (supratherapeutic), held for 1h and restarted at 20 mL/hr. Ancef 100mg q12 for cellulitis. HDS, saturating well on RA. Compression and elevation of leg. CT of the right lower extremity demonstrated cellulitis of the right lower extremity with reactive lymphadenopathy in the pelvis and inguinal nodes and venous varicosities. CT angio of the left lower extremity. US Duplex showed deep vein thrombosis visualized within the right mid and distal femoral vein. The right popliteal and calf veins were not visualized due to extensive subcutaneous edema.        O/N:10pm PTT 51.9, Heparin gtt. increased from 20 mL/hr to 22mL/hr.        The patient is ready to be discharged.

## 2020-08-17 NOTE — CONSULT NOTE ADULT - SUBJECTIVE AND OBJECTIVE BOX
CC: RLE pain/swelling    HPI:  Per admission H&P:  "Mr. Taylor is a 40 year old man who presented to [facility] this morning with a chief complaint of worsening RLE pain. He was transferred to [facility] for further management. Per Mr. Taylor, he has a PMH of DVT for which he was treated 2 years ago. At that time, he was treated with an approximately 1 year course of Xarelto, after which he was taken off because he did not have any further episodes of DVT. He does not recall any provocating factors for his previous DVT. He notes he has never been diagnosed with any kind of hypercoagulable disorder but notes that his mother had passed away because of a massive pulmonary embolism. Today, Mr. Taylor reports that he has had a gradual onset of pain and swelling of his RLE for the past 10 days. He initially went to a hospital in [place], where they did an ultrasound that was negative. He was diagnosed with cellulitis and discharged home with a regimen of Bactrim. He notes that he has been taking the antibiotics without any resultant improvement in his symptoms. He ultimately came to the hospital today because he experienced unbearable pain that made it difficult for him to walk. He expresses associated shortness of breath but no difficulty breathing. He denies any fevers, chills, chest pain, palpations, cough, or wheezing. (16 Aug 2020 15:20)"    Patient yesterday afternoon to vascular surgery service, started on heparin gtt. Seen by me this morning; he confirms above history. Pain/swelling stable. No fever/chills, SOB, chest pain, headache, dizziness, lightheadedness. Denies any known history of cancers, no recent travel, works as a  where he is on his feet most of the time.     12 point ROS reviewed and negative except as otherwise stated in HPI and below    PAST MEDICAL & SURGICAL HISTORY:  DVT (deep venous thrombosis)  No significant past surgical history    SOCIAL HISTORY:  Tobacco: denies  Alcohol: social  Illicit Drugs: daily smoked marijuana  Living situation: alone  ADLs: independent  works as     FAMILY HISTORY:  Mother had PE    ALLERGIES:  No Known Allergies      HOME MEDICATIONS:  none    Vital Signs Last 24 Hrs  T(F): 98.1 (17 Aug 2020 08:30), Max: 98.9 (16 Aug 2020 16:17)  HR: 60 (17 Aug 2020 08:30) (60 - 71)  BP: 136/95 (17 Aug 2020 08:30) (132/87 - 137/92)  RR: 16 (17 Aug 2020 08:30) (16 - 18)  SpO2: 100% (17 Aug 2020 08:30) (98% - 100%)    PHYSICAL EXAM:  GENERAL: pleasant, appropriate, no acute distress, well-groomed, well-developed  HEAD:  Atraumatic, Normocephalic  EYES: EOMI, PERRLA, conjunctiva and sclera clear  ENMT: No tonsillar erythema, exudates, or enlargement; Moist mucous membranes, Good dentition  NECK: Supple, No JVD  CHEST/LUNG: Clear to auscultation bilaterally; No rales, rhonchi, wheezing, or rubs  HEART: Regular rate and rhythm; S1/S2, No murmurs, rubs, or gallops  ABDOMEN: Obese, Soft, Nontender, Nondistended; Bowel sounds present  VASCULAR: Normal pulses, Normal capillary refill  EXTREMITIES:  2+ Peripheral Pulses, No cyanosis, RLE swollen and warm, tender to palpation, wrapped in kerlix  LYMPH: No lymphadenopathy noted  SKIN: Warm, Intact  PSYCH: Normal mood and affect  NERVOUS SYSTEM:  A/O x3, CN 2-12 intact, No focal deficits    LABS:                        12.6   9.69  )-----------( 384      ( 17 Aug 2020 07:37 )             40.2     08-17    136  |  102  |  7   ----------------------------<  96  4.5   |  26  |  0.85    Ca    9.3      17 Aug 2020 07:37  Phos  2.7     08-17  Mg     2.3     08-17    TPro  7.4  /  Alb  2.8  /  TBili  0.3  /  DBili  x   /  AST  30  /  ALT  49  /  AlkPhos  106  08-16    PT/INR - ( 16 Aug 2020 06:29 )   PT: 10.9 sec;   INR: 0.90          PTT - ( 17 Aug 2020 07:37 )  PTT:71.6 sec        RADIOLOGY & ADDITIONAL TESTS:< from: US Duplex Ext Veins Limited, Right (08.16.20 @ 08:47) >  FINDINGS:    Thigh veins: The right commonfemoral, proximal femoral, proximal greater saphenous, and proximal deep femoral veins are patent and free of thrombus. The veins are normally compressible and have normal phasic flow and augmentation response. There is thrombus within the right mid and distal femoral vein. The right popliteal vein was not visualized due to extensive subcutaneous edema.    Calf veins: The paired peroneal and posterior tibial calf veins were not visualized due to extensive subcutaneous edema.    Contralateral CFV:The contralateral (left) common femoral vein is patent and free of thrombus.      IMPRESSION:  Deep vein thrombosis visualized within the right mid and distal femoral vein. The right popliteal and calf veins were not visualized due to extensive subcutaneous edema.    < end of copied text >

## 2020-08-17 NOTE — CONSULT NOTE ADULT - ASSESSMENT
40YOM with prior history of DVT (now off AC) who is admitted for recurrent RLE unprovoked DVT.    RLE unprovoked DVT  Has prior history of DVT, taken off anticoagulation by outpatient provider, admitted for recurrent unprovoked DVT. On heparin gtt, primary team planning to transition to Xarelto for discharge. Also on cefazolin. No known personal or family history of hypercoagulable disorder (though mother had PE).  -continue Xarelto indefinitely, I counseled that he needs to see PCP for surveillance of his DVT and for duration of anticoagulation  -given that this is his second unprovoked DVT, he needs hypercoagulable workup which can be done as outpatient if team is planning for discharge today.    Obesity - BMI is 48, affects all aspects of care

## 2020-08-17 NOTE — DISCHARGE NOTE PROVIDER - NSDCFUADDINST_GEN_ALL_CORE_FT
Follow up with Dr. Lo  in 1-2 weeks. Call the office  to schedule your appointment.     Please resume all regular home medications unless specifically advised not to take a particular medication. Also, please take any new medications as prescribed.  Start taking Xarelto 15 mg twice a day for 3 weeks. Then switch to Xarelto 20 mg daily.  Take 500 mg of Keflex 4 times a day for 7 days.    Please follow-up with your surgeon and Primary Care Provider (PCP) as advised.    To reduce right leg swelling please apply ACE wrap from right foot to upper thigh.    Warning Signs:  Please call your doctor or nurse practitioner if you experience the following:  *You experience new chest pain, pressure, squeezing or tightness.  *New or worsening cough, shortness of breath, or wheeze.  *You have shaking chills, or fever greater than 101.5 degrees Fahrenheit or 38 degrees Celsius.  *Any change in your symptoms, or any new symptoms that concern you.

## 2020-08-17 NOTE — DISCHARGE NOTE PROVIDER - CARE PROVIDERS DIRECT ADDRESSES
[email or web address]

## 2020-08-17 NOTE — DISCHARGE NOTE NURSING/CASE MANAGEMENT/SOCIAL WORK - PATIENT PORTAL LINK FT
You can access the FollowMyHealth Patient Portal offered by [facility] by registering at the following website: [email or web address]. By joining [facility]’s FollowMyHealth portal, you will also be able to view your health information using other applications (apps) compatible with our system.

## 2020-08-17 NOTE — DISCHARGE NOTE PROVIDER - NSDCCPCAREPLAN_GEN_ALL_CORE_FT
PRINCIPAL DISCHARGE DIAGNOSIS  Diagnosis: DVT (deep venous thrombosis)  Assessment and Plan of Treatment:       SECONDARY DISCHARGE DIAGNOSES  Diagnosis: Cellulitis  Assessment and Plan of Treatment:

## 2020-08-17 NOTE — DISCHARGE NOTE PROVIDER - CARE PROVIDER_API CALL
Arnulfo Lo)  [facility] Surgery  Vascular  [address]  [place], [place] [address]  Phone: [phone]  Fax: [phone]  Follow Up Time:

## 2020-08-21 DIAGNOSIS — F17.200 NICOTINE DEPENDENCE, UNSPECIFIED, UNCOMPLICATED: ICD-10-CM

## 2020-08-21 DIAGNOSIS — M79.89 OTHER SPECIFIED SOFT TISSUE DISORDERS: ICD-10-CM

## 2020-08-21 DIAGNOSIS — I82.411 ACUTE EMBOLISM AND THROMBOSIS OF RIGHT FEMORAL VEIN: ICD-10-CM

## 2020-08-21 DIAGNOSIS — E66.9 OBESITY, UNSPECIFIED: ICD-10-CM

## 2020-08-21 DIAGNOSIS — L03.115 CELLULITIS OF RIGHT LOWER LIMB: ICD-10-CM

## 2020-08-21 DIAGNOSIS — I82.439 ACUTE EMBOLISM AND THROMBOSIS OF UNSPECIFIED POPLITEAL VEIN: ICD-10-CM

## 2020-08-21 DIAGNOSIS — F12.99 CANNABIS USE, UNSPECIFIED WITH UNSPECIFIED CANNABIS-INDUCED DISORDER: ICD-10-CM

## 2020-12-27 NOTE — ED PROVIDER NOTE - CHIEF COMPLAINT
The patient is a 40y Male complaining of yes The patient is a 40y Male complaining of worsening RLE pain

## 2022-01-01 NOTE — DISCHARGE NOTE NURSING/CASE MANAGEMENT/SOCIAL WORK - NSDCPEXARELTOREACT_GEN_ALL_CORE
3
Rivaroxaban/Xarelto increases your risk for bleeding. Notify your doctor if you experience any of the following side effects: unusual bleeding or bruising, vomiting blood or coffee ground-like material, red or black stool, itching or hives, chest tightness, trouble breathing, swelling in your face or hands, swelling in your mouth or throat, change in how much or how often you urinate, red or brown urine, heavy menstrual or vaginal bleeding, or blistering or peeling skin. When Rivaroxaban/Xarelto is taken with other medicines, they can affect how it works. Taking other medications such as aspirin, antibiotics, antifungals, blood thinners, nonsteroidal anti-inflammatories, and medications that treat depression can increase your risk of bleeding. It is very important to tell your health care provider about all of the other medicines, including over-the-counter medications, herbs, and vitamins you are taking.  DO NOT start, stop, or change the dosage of any medicine, including over-the-counter medicines, vitamins, and herbal products without your doctor’s approval.  Any products containing aspirin or are nonsteroidal anti-inflammatories lessen the blood’s ability to form clots and adds to the effect of Rivaroxaban/Xarelto. Never take aspirin or medicines that contain aspirin without speaking to your doctor.

## 2022-03-17 NOTE — ED ADULT NURSE NOTE - NS ED NURSE LEVEL OF CONSCIOUSNESS MENTAL STATUS
Health Maintenance Due   Topic Date Due    COVID-19 Vaccine (1) Never done    Pneumococcal Vaccines (Age 0-64) (1 of 2 - PPSV23) Never done    HIV Screening  Never done    TETANUS VACCINE  Never done    Mammogram  Never done    Colorectal Cancer Screening  Never done    Shingles Vaccine (1 of 2) Never done    Influenza Vaccine (1) 09/01/2021     Updates were requested from care everywhere.  Chart was reviewed for overdue Proactive [facility] Encounters (NUVIA) topics (CRS, Breast Cancer Screening, Eye exam)  Health Maintenance has been updated.  LINKS immunization registry triggered.  Immunizations were reconciled.      
Awake/Alert

## 2023-09-13 NOTE — ED ADULT NURSE NOTE - NSIMPLEMENTINTERV_GEN_ALL_ED
Yes Implemented All Universal Safety Interventions:  [place] to call system. Call bell, personal items and telephone within reach. Instruct patient to call for assistance. Room bathroom lighting operational. Non-slip footwear when patient is off stretcher. Physically safe environment: no spills, clutter or unnecessary equipment. Stretcher in lowest position, wheels locked, appropriate side rails in place.

## 2023-09-23 NOTE — ED ADULT NURSE NOTE - ISOLATION TYPE:
This was a shared visit with the WALTER. I reviewed and verified the documentation and independently performed the documented:
None